# Patient Record
(demographics unavailable — no encounter records)

---

## 2025-03-10 NOTE — HISTORY OF PRESENT ILLNESS
[FreeTextEntry1] : 68 yo александр with chemo induced cardiomyopathy s/p ICD implant at [facility]  Has poor effort tolernce largely due to lack of desire to walk  Occasional dizziness and lightheadedness and near syncopy  EP f/u done 1/21 Has ongoing left hip groin pain  Ct last year mild djd of spine  Was given ref for PT which she did not pursue  no edema no Dyspnea  BP at home in normal range  has "white coat " feeling  11/1/21 preop for cataract surgery no icd d/c feels well fatigued at times  no edema or orthopnea seen by ortho told she has djd was given nsaids  05/11/23 Patient is a 71-year-old black female with a prior history of chemo-induced cardiomyopathy with a defibrillator placed number of years ago for prevention of VT VF.  Patient has not been seen in approximately 1 years time main limitation lately has been pseudoclaudication due to severe back pain.  Walking is limited to less than 1 block limited largely by back pain.  She does not report any defibrillatory shocks no edema orthopnea PND. 3/10/25 last seen 5/23 has ICD for cardiomyopathy due to chemo

## 2025-03-10 NOTE — DISCUSSION/SUMMARY
[Stable] : stable [Basic Metabolic Panel] : basic metabolic panel [Serum Cardiac Markers] : serum cardiac markers [Thyroid Function Tests] : thyroid function tests [BNP] : B-type natriuretic peptide [ECG] : ECG [Echocardiogram] : echocardiogram [Continue] : continuing aldosterone antagonists [AICD Function Normal] : normal AICD function [Patient] : the patient [EKG obtained to assist in diagnosis and management of assessed problem(s)] : EKG obtained to assist in diagnosis and management of assessed problem(s) [de-identified] : chemo induced CM [FreeTextEntry2] : No discharges  [FreeTextEntry1] : CHF compensated on Med no edema BNP pending BP elevated will increase Coreg 25 bid  ICD fx is ok  Lvef 40% mod MR

## 2025-03-10 NOTE — PHYSICAL EXAM
[Well Developed] : well developed [Well Nourished] : well nourished [No Acute Distress] : no acute distress [Normal Conjunctiva] : normal conjunctiva [Normal Venous Pressure] : normal venous pressure [No Carotid Bruit] : no carotid bruit [Rhythm Regular] : regular [II] : a grade 2 [___ +] : bilateral [unfilled]U+ pitting edema to the ankles [Right Carotid Bruit] : right carotid bruit heard [Left Carotid Bruit] : left carotid bruit heard [Clear Lung Fields] : clear lung fields [Good Air Entry] : good air entry [No Respiratory Distress] : no respiratory distress  [Soft] : abdomen soft [Non Tender] : non-tender [No Masses/organomegaly] : no masses/organomegaly [Normal Bowel Sounds] : normal bowel sounds [Normal Gait] : normal gait [No Edema] : no edema [No Cyanosis] : no cyanosis [No Clubbing] : no clubbing [No Varicosities] : no varicosities [No Rash] : no rash [No Skin Lesions] : no skin lesions [Moves all extremities] : moves all extremities [No Focal Deficits] : no focal deficits [Normal Speech] : normal speech [Alert and Oriented] : alert and oriented [Normal memory] : normal memory [Normal S1] : abnormal S1 [Normal S2] : abnormal S2 [S3] : no S3 [S4] : no S4 [Click] : no click [Rt] : no varicose veins of the right leg [Lt] : no varicose veins of the left leg

## 2025-03-10 NOTE — ASSESSMENT
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 30 min aerobic exercise per day. Calorie reduction to target BMI<25

## 2025-03-10 NOTE — ADDENDUM
[FreeTextEntry1] : Rudy Polanco assisted in documentation on Mar 10 2025 acting as a scribe for Dr. Carlton Solano.